# Patient Record
Sex: MALE | Race: BLACK OR AFRICAN AMERICAN | NOT HISPANIC OR LATINO | Employment: FULL TIME | URBAN - METROPOLITAN AREA
[De-identification: names, ages, dates, MRNs, and addresses within clinical notes are randomized per-mention and may not be internally consistent; named-entity substitution may affect disease eponyms.]

---

## 2020-03-08 ENCOUNTER — CLINICAL SUPPORT (OUTPATIENT)
Dept: CARDIOLOGY | Facility: HOSPITAL | Age: 20
End: 2020-03-08
Attending: EMERGENCY MEDICINE

## 2020-03-08 ENCOUNTER — HOSPITAL ENCOUNTER (OUTPATIENT)
Facility: HOSPITAL | Age: 20
Discharge: HOME | End: 2020-03-10
Attending: EMERGENCY MEDICINE | Admitting: HOSPITALIST

## 2020-03-08 DIAGNOSIS — I51.4 MYOCARDITIS, UNSPECIFIED CHRONICITY, UNSPECIFIED MYOCARDITIS TYPE: Primary | ICD-10-CM

## 2020-03-08 DIAGNOSIS — R07.9 CHEST PAIN: ICD-10-CM

## 2020-03-08 DIAGNOSIS — I31.9 PERICARDITIS: ICD-10-CM

## 2020-03-08 PROBLEM — I40.1 IDIOPATHIC MYOCARDITIS: Status: ACTIVE | Noted: 2020-03-08

## 2020-03-08 LAB
ALBUMIN SERPL BCP-MCNC: 4.3 G/DL (ref 3.5–5.2)
ALP SERPL-CCNC: 53 U/L (ref 55–135)
ALT SERPL W/O P-5'-P-CCNC: 23 U/L (ref 10–44)
AMPHET+METHAMPHET UR QL: NORMAL
ANION GAP SERPL CALC-SCNC: 14 MMOL/L (ref 8–16)
AORTIC ROOT ANNULUS: 3.02 CM
AORTIC VALVE CUSP SEPERATION: 1.49 CM
APTT PPP: 34.1 SEC (ref 23.6–33.3)
AST SERPL-CCNC: 102 U/L (ref 10–40)
AV PEAK GRADIENT: 3 MMHG
BACTERIA #/AREA URNS HPF: NEGATIVE /HPF
BARBITURATES UR QL SCN>200 NG/ML: NEGATIVE
BASOPHILS # BLD AUTO: 0.02 K/UL (ref 0–0.2)
BASOPHILS NFR BLD: 0.1 % (ref 0–1.9)
BENZODIAZ UR QL SCN>200 NG/ML: NEGATIVE
BILIRUB SERPL-MCNC: 1.2 MG/DL (ref 0.1–1)
BILIRUB UR QL STRIP: NEGATIVE
BSA FOR ECHO PROCEDURE: 1.81 M2
BUN SERPL-MCNC: 8 MG/DL (ref 6–20)
BZE UR QL SCN: NEGATIVE
CALCIUM SERPL-MCNC: 9.7 MG/DL (ref 8.7–10.5)
CANNABINOIDS UR QL SCN: NORMAL
CHLORIDE SERPL-SCNC: 93 MMOL/L (ref 95–110)
CK MB SERPL-MCNC: 139.5 NG/ML (ref 0.1–6.5)
CK SERPL-CCNC: 1244 U/L (ref 20–200)
CLARITY UR: CLEAR
CO2 SERPL-SCNC: 25 MMOL/L (ref 23–29)
COLOR UR: YELLOW
CREAT SERPL-MCNC: 0.7 MG/DL (ref 0.5–1.4)
CREAT UR-MCNC: 29 MG/DL (ref 23–375)
CRP SERPL-MCNC: 10.3 MG/DL (ref 0–0.75)
CV ECHO LV RWT: 0.33 CM
DIFFERENTIAL METHOD: ABNORMAL
DOP CALC AO PEAK VEL: 0.82 M/S
DOP CALC LVOT AREA: 3.5 CM2
DOP CALC LVOT DIAMETER: 2.1 CM
ECHO LV POSTERIOR WALL: 0.91 CM (ref 0.6–1.1)
EOSINOPHIL # BLD AUTO: 0 K/UL (ref 0–0.5)
EOSINOPHIL NFR BLD: 0 % (ref 0–8)
ERYTHROCYTE [DISTWIDTH] IN BLOOD BY AUTOMATED COUNT: 12.4 % (ref 11.5–14.5)
ERYTHROCYTE [SEDIMENTATION RATE] IN BLOOD BY WESTERGREN METHOD: 5 MM/HR (ref 0–10)
EST. GFR  (AFRICAN AMERICAN): >60 ML/MIN/1.73 M^2
EST. GFR  (NON AFRICAN AMERICAN): >60 ML/MIN/1.73 M^2
FRACTIONAL SHORTENING: 24 % (ref 28–44)
GLUCOSE SERPL-MCNC: 128 MG/DL (ref 70–110)
GLUCOSE UR QL STRIP: NEGATIVE
HCT VFR BLD AUTO: 45.9 % (ref 40–54)
HGB BLD-MCNC: 15.5 G/DL (ref 14–18)
HGB UR QL STRIP: ABNORMAL
HIV1+2 IGG SERPL QL IA.RAPID: NEGATIVE
HYALINE CASTS #/AREA URNS LPF: 0 /LPF
IMM GRANULOCYTES # BLD AUTO: 0.11 K/UL (ref 0–0.04)
IMM GRANULOCYTES NFR BLD AUTO: 0.5 % (ref 0–0.5)
INR PPP: 1.2
INTERVENTRICULAR SEPTUM: 0.73 CM (ref 0.6–1.1)
KETONES UR QL STRIP: NEGATIVE
LEFT ATRIUM SIZE: 3.52 CM
LEFT INTERNAL DIMENSION IN SYSTOLE: 4.17 CM (ref 2.1–4)
LEFT VENTRICLE MASS INDEX: 91 G/M2
LEFT VENTRICULAR INTERNAL DIMENSION IN DIASTOLE: 5.48 CM (ref 3.5–6)
LEFT VENTRICULAR MASS: 163.99 G
LEUKOCYTE ESTERASE UR QL STRIP: NEGATIVE
LYMPHOCYTES # BLD AUTO: 2.2 K/UL (ref 1–4.8)
LYMPHOCYTES NFR BLD: 11 % (ref 18–48)
MAGNESIUM SERPL-MCNC: 2.1 MG/DL (ref 1.6–2.6)
MCH RBC QN AUTO: 30.6 PG (ref 27–31)
MCHC RBC AUTO-ENTMCNC: 33.8 G/DL (ref 32–36)
MCV RBC AUTO: 91 FL (ref 82–98)
MICROSCOPIC COMMENT: NORMAL
MONOCYTES # BLD AUTO: 2.1 K/UL (ref 0.3–1)
MONOCYTES NFR BLD: 10.5 % (ref 4–15)
NEUTROPHILS # BLD AUTO: 15.9 K/UL (ref 1.8–7.7)
NEUTROPHILS NFR BLD: 77.9 % (ref 38–73)
NITRITE UR QL STRIP: NEGATIVE
NRBC BLD-RTO: 0 /100 WBC
OPIATES UR QL SCN: NEGATIVE
PCP UR QL SCN>25 NG/ML: NEGATIVE
PH UR STRIP: 7 [PH] (ref 5–8)
PISA TR MAX VEL: 2.59 M/S
PLATELET # BLD AUTO: 279 K/UL (ref 150–350)
PMV BLD AUTO: 10.3 FL (ref 9.2–12.9)
POTASSIUM SERPL-SCNC: 3.9 MMOL/L (ref 3.5–5.1)
PROT SERPL-MCNC: 7.8 G/DL (ref 6–8.4)
PROT UR QL STRIP: NEGATIVE
PROTHROMBIN TIME: 14.5 SEC (ref 10.6–14.8)
PV PEAK VELOCITY: 72.11 CM/S
RBC # BLD AUTO: 5.06 M/UL (ref 4.6–6.2)
RBC #/AREA URNS HPF: 2 /HPF (ref 0–4)
RIGHT VENTRICULAR END-DIASTOLIC DIMENSION: 196 CM
SODIUM SERPL-SCNC: 132 MMOL/L (ref 136–145)
SP GR UR STRIP: 1 (ref 1–1.03)
SQUAMOUS #/AREA URNS HPF: 0 /HPF
TDI LATERAL: 0.12 M/S
TDI SEPTAL: 0.11 M/S
TDI: 0.12 M/S
TOXICOLOGY INFORMATION: NORMAL
TR MAX PG: 27 MMHG
TROPONIN I SERPL DL<=0.01 NG/ML-MCNC: 15.91 NG/ML
TROPONIN I SERPL DL<=0.01 NG/ML-MCNC: 18.27 NG/ML
TROPONIN I SERPL DL<=0.01 NG/ML-MCNC: 25.42 NG/ML
URN SPEC COLLECT METH UR: ABNORMAL
UROBILINOGEN UR STRIP-ACNC: NEGATIVE EU/DL
WBC # BLD AUTO: 21.19 K/UL (ref 3.9–12.7)
WBC #/AREA URNS HPF: 1 /HPF (ref 0–5)

## 2020-03-08 PROCEDURE — 80307 DRUG TEST PRSMV CHEM ANLYZR: CPT

## 2020-03-08 PROCEDURE — 83735 ASSAY OF MAGNESIUM: CPT

## 2020-03-08 PROCEDURE — 82553 CREATINE MB FRACTION: CPT

## 2020-03-08 PROCEDURE — 87040 BLOOD CULTURE FOR BACTERIA: CPT | Mod: 59

## 2020-03-08 PROCEDURE — 96375 TX/PRO/DX INJ NEW DRUG ADDON: CPT

## 2020-03-08 PROCEDURE — 85730 THROMBOPLASTIN TIME PARTIAL: CPT

## 2020-03-08 PROCEDURE — 93306 TTE W/DOPPLER COMPLETE: CPT

## 2020-03-08 PROCEDURE — 84484 ASSAY OF TROPONIN QUANT: CPT | Mod: 91

## 2020-03-08 PROCEDURE — 93005 ELECTROCARDIOGRAM TRACING: CPT | Performed by: INTERNAL MEDICINE

## 2020-03-08 PROCEDURE — 96376 TX/PRO/DX INJ SAME DRUG ADON: CPT

## 2020-03-08 PROCEDURE — G0378 HOSPITAL OBSERVATION PER HR: HCPCS

## 2020-03-08 PROCEDURE — 81001 URINALYSIS AUTO W/SCOPE: CPT

## 2020-03-08 PROCEDURE — 25000003 PHARM REV CODE 250: Performed by: EMERGENCY MEDICINE

## 2020-03-08 PROCEDURE — 99291 CRITICAL CARE FIRST HOUR: CPT

## 2020-03-08 PROCEDURE — 85025 COMPLETE CBC W/AUTO DIFF WBC: CPT

## 2020-03-08 PROCEDURE — 96374 THER/PROPH/DIAG INJ IV PUSH: CPT

## 2020-03-08 PROCEDURE — 80053 COMPREHEN METABOLIC PANEL: CPT

## 2020-03-08 PROCEDURE — 85610 PROTHROMBIN TIME: CPT

## 2020-03-08 PROCEDURE — 86140 C-REACTIVE PROTEIN: CPT

## 2020-03-08 PROCEDURE — 25000003 PHARM REV CODE 250: Performed by: HOSPITALIST

## 2020-03-08 PROCEDURE — 82550 ASSAY OF CK (CPK): CPT

## 2020-03-08 PROCEDURE — 93005 ELECTROCARDIOGRAM TRACING: CPT

## 2020-03-08 PROCEDURE — 85651 RBC SED RATE NONAUTOMATED: CPT

## 2020-03-08 PROCEDURE — 63600175 PHARM REV CODE 636 W HCPCS: Performed by: HOSPITALIST

## 2020-03-08 PROCEDURE — 63600175 PHARM REV CODE 636 W HCPCS: Performed by: EMERGENCY MEDICINE

## 2020-03-08 PROCEDURE — 36415 COLL VENOUS BLD VENIPUNCTURE: CPT

## 2020-03-08 PROCEDURE — 86703 HIV-1/HIV-2 1 RESULT ANTBDY: CPT

## 2020-03-08 RX ORDER — INDOMETHACIN 75 MG/1
75 CAPSULE, EXTENDED RELEASE ORAL 2 TIMES DAILY WITH MEALS
Status: DISCONTINUED | OUTPATIENT
Start: 2020-03-08 | End: 2020-03-08

## 2020-03-08 RX ORDER — ONDANSETRON 2 MG/ML
4 INJECTION INTRAMUSCULAR; INTRAVENOUS
Status: COMPLETED | OUTPATIENT
Start: 2020-03-08 | End: 2020-03-08

## 2020-03-08 RX ORDER — KETOROLAC TROMETHAMINE 30 MG/ML
15 INJECTION, SOLUTION INTRAMUSCULAR; INTRAVENOUS EVERY 6 HOURS PRN
Status: DISCONTINUED | OUTPATIENT
Start: 2020-03-08 | End: 2020-03-09

## 2020-03-08 RX ORDER — ASPIRIN 81 MG/1
81 TABLET ORAL DAILY
Status: DISCONTINUED | OUTPATIENT
Start: 2020-03-08 | End: 2020-03-10 | Stop reason: HOSPADM

## 2020-03-08 RX ORDER — ONDANSETRON 2 MG/ML
4 INJECTION INTRAMUSCULAR; INTRAVENOUS EVERY 6 HOURS PRN
Status: DISCONTINUED | OUTPATIENT
Start: 2020-03-08 | End: 2020-03-10 | Stop reason: HOSPADM

## 2020-03-08 RX ORDER — NAPROXEN SODIUM 220 MG/1
324 TABLET, FILM COATED ORAL
Status: COMPLETED | OUTPATIENT
Start: 2020-03-08 | End: 2020-03-08

## 2020-03-08 RX ORDER — COLCHICINE 0.6 MG/1
0.6 TABLET, FILM COATED ORAL
Status: COMPLETED | OUTPATIENT
Start: 2020-03-08 | End: 2020-03-08

## 2020-03-08 RX ORDER — COLCHICINE 0.6 MG/1
0.6 TABLET, FILM COATED ORAL EVERY 6 HOURS
Status: DISCONTINUED | OUTPATIENT
Start: 2020-03-08 | End: 2020-03-09

## 2020-03-08 RX ORDER — ACETAMINOPHEN 325 MG/1
650 TABLET ORAL EVERY 6 HOURS PRN
Status: DISCONTINUED | OUTPATIENT
Start: 2020-03-08 | End: 2020-03-10 | Stop reason: HOSPADM

## 2020-03-08 RX ORDER — SODIUM CHLORIDE 0.9 % (FLUSH) 0.9 %
10 SYRINGE (ML) INJECTION
Status: DISCONTINUED | OUTPATIENT
Start: 2020-03-08 | End: 2020-03-10 | Stop reason: HOSPADM

## 2020-03-08 RX ORDER — KETOROLAC TROMETHAMINE 30 MG/ML
15 INJECTION, SOLUTION INTRAMUSCULAR; INTRAVENOUS
Status: COMPLETED | OUTPATIENT
Start: 2020-03-08 | End: 2020-03-08

## 2020-03-08 RX ADMIN — ONDANSETRON 4 MG: 2 INJECTION INTRAMUSCULAR; INTRAVENOUS at 09:03

## 2020-03-08 RX ADMIN — COLCHICINE 0.6 MG: 0.6 TABLET, FILM COATED ORAL at 11:03

## 2020-03-08 RX ADMIN — NITROGLYCERIN 1 INCH: 20 OINTMENT TOPICAL at 11:03

## 2020-03-08 RX ADMIN — ASPIRIN 81 MG 324 MG: 81 TABLET ORAL at 11:03

## 2020-03-08 RX ADMIN — KETOROLAC TROMETHAMINE 15 MG: 30 INJECTION, SOLUTION INTRAMUSCULAR at 07:03

## 2020-03-08 RX ADMIN — METHYLPREDNISOLONE SODIUM SUCCINATE 125 MG: 40 INJECTION, POWDER, FOR SOLUTION INTRAMUSCULAR; INTRAVENOUS at 07:03

## 2020-03-08 RX ADMIN — KETOROLAC TROMETHAMINE 15 MG: 30 INJECTION, SOLUTION INTRAMUSCULAR at 09:03

## 2020-03-08 RX ADMIN — ONDANSETRON 4 MG: 2 INJECTION INTRAMUSCULAR; INTRAVENOUS at 07:03

## 2020-03-08 RX ADMIN — COLCHICINE 0.6 MG: 0.6 TABLET, FILM COATED ORAL at 07:03

## 2020-03-08 NOTE — SUBJECTIVE & OBJECTIVE
Past Medical History:   Diagnosis Date    Substance abuse        Past Surgical History:   Procedure Laterality Date    TONSILLECTOMY         Review of patient's allergies indicates:   Allergen Reactions    Penicillins        No current facility-administered medications on file prior to encounter.      No current outpatient medications on file prior to encounter.     Family History     None        Tobacco Use    Smoking status: Current Every Day Smoker   Substance and Sexual Activity    Alcohol use: Yes    Drug use: Yes     Types: Marijuana     Comment: ECTASY    Sexual activity: Not on file     Review of Systems   Constitutional: Negative for activity change, appetite change, diaphoresis and fatigue.   HENT: Negative for sinus pressure, sore throat and trouble swallowing.    Respiratory: Negative for cough, choking and shortness of breath.    Cardiovascular: Positive for chest pain. Negative for palpitations and leg swelling.   Gastrointestinal: Negative for abdominal pain, constipation, diarrhea, nausea and vomiting.   Genitourinary: Negative for difficulty urinating, dysuria and frequency.   Musculoskeletal: Negative for back pain, gait problem and neck pain.   Skin: Negative for rash.   Neurological: Negative for seizures, syncope and weakness.   Psychiatric/Behavioral: Negative for confusion. The patient is not nervous/anxious.      Objective:     Vital Signs (Most Recent):  Temp: 98.2 °F (36.8 °C) (03/08/20 0914)  Pulse: 102 (03/08/20 1101)  Resp: 20 (03/08/20 1101)  BP: 127/65 (03/08/20 1101)  SpO2: 100 % (03/08/20 1101) Vital Signs (24h Range):  Temp:  [98.2 °F (36.8 °C)] 98.2 °F (36.8 °C)  Pulse:  [] 102  Resp:  [18-22] 20  SpO2:  [99 %-100 %] 100 %  BP: (117-127)/(65-96) 127/65     Weight: 70.3 kg (155 lb)  Body mass index is 25.02 kg/m².    Physical Exam   Constitutional: He is oriented to person, place, and time. He appears well-developed and well-nourished.   Patient appears in no distress  lying on a gurney in the emergency department   Eyes: Conjunctivae are normal. No scleral icterus.   Sclera non icteric   Neck: Normal range of motion. Neck supple.   Cardiovascular: Normal rate, regular rhythm and normal heart sounds.   Pulmonary/Chest: Effort normal and breath sounds normal.   Abdominal: Soft. Bowel sounds are normal. There is no tenderness.   Musculoskeletal: Normal range of motion.   Neurological: He is alert and oriented to person, place, and time.   Patient is alert oriented x3 motor exam is nonfocal   Skin: Skin is warm. No rash noted.   Psychiatric: He has a normal mood and affect. His behavior is normal.   Nursing note and vitals reviewed.             Significant Labs:   BMP:   Recent Labs   Lab 03/08/20  0939   *   *   K 3.9   CL 93*   CO2 25   BUN 8   CREATININE 0.7   CALCIUM 9.7   MG 2.1     CBC:   Recent Labs   Lab 03/08/20  0939   WBC 21.19*   HGB 15.5   HCT 45.9        CMP:   Recent Labs   Lab 03/08/20  0939   *   K 3.9   CL 93*   CO2 25   *   BUN 8   CREATININE 0.7   CALCIUM 9.7   PROT 7.8   ALBUMIN 4.3   BILITOT 1.2*   ALKPHOS 53*   *   ALT 23   ANIONGAP 14   EGFRNONAA >60.0       Significant Imaging:  Chest x-ray read by me shows no acute cardiopulmonary process  ECG read by me shows sinus rhythm elevated ST segments

## 2020-03-08 NOTE — HPI
Patient is a 19-year-old male with no known past medical history who presents to our ER while visiting [facility]. .  Patient states over the last 2-3 days he has been smoking pot, taking Ecstasy and drinking some alcohol and energy drinks .  Today patient has been having moderate substernal chest pain which he describes as constant and radiating toward his neck.  Patient denies fever chills nausea vomiting diaphoresis abdominal pain bowel changes or urinary changes.  Pain is relieved by leaning forward  Patient was seen by Cardiology in the emergency department and I discussed case with Dr. Jordan.  Cardiology feels that patient probably has myocarditi Patient has had No recent illness.  He lives in [place]- no travel other than here

## 2020-03-08 NOTE — ED PROVIDER NOTES
Encounter Date: 3/8/2020       History     Chief Complaint   Patient presents with    Chest Pain     X 1 DAY, NEG SCREEN    Nausea     Patient with no significant past medical history.  Patient is visiting [facility].  Patient states he did drink energy drink and took ecstasy yesterday.  Today yes sternal chest pain.  Described as a uncomfortable sternal sensation.  No radiation.  Improved with leaning forward.  No fever or chills. No recent illness.  Pain is moderate in nature and continues in the emergency room.        Review of patient's allergies indicates:   Allergen Reactions    Penicillins      History reviewed. No pertinent past medical history.  Past Surgical History:   Procedure Laterality Date    TONSILLECTOMY       No family history on file.  Social History     Tobacco Use    Smoking status: Current Every Day Smoker   Substance Use Topics    Alcohol use: Yes    Drug use: Yes     Types: Marijuana     Comment: ECTASY     Review of Systems   Constitutional: Negative for chills and fever.   HENT: Negative for sore throat.    Eyes: Negative for photophobia and visual disturbance.   Respiratory: Negative for shortness of breath.    Cardiovascular: Positive for chest pain.   Gastrointestinal: Negative for abdominal pain and vomiting.   Genitourinary: Negative for dysuria.   Musculoskeletal: Negative for joint swelling.   Skin: Negative for rash.   Neurological: Negative for weakness and headaches.   Psychiatric/Behavioral: Negative for confusion.       Physical Exam     Initial Vitals [03/08/20 0914]   BP Pulse Resp Temp SpO2   121/83 96 18 98.2 °F (36.8 °C) 100 %      MAP       --         Physical Exam    Nursing note and vitals reviewed.  Constitutional: He is not diaphoretic. No distress.   HENT:   Head: Normocephalic and atraumatic.   Eyes: Conjunctivae are normal.   Neck: Normal range of motion.   Cardiovascular: Regular rhythm.   No rubs or murmurs   Pulmonary/Chest: Breath sounds normal.   No real  reproducible chest wall pain   Abdominal: Soft. There is no tenderness.   Musculoskeletal: Normal range of motion.   Skin: No rash noted.   Psychiatric: He has a normal mood and affect.         ED Course   Critical Care  Date/Time: 3/8/2020 10:54 AM  Performed by: Mario Mccain MD  Authorized by: Mario Mccain MD   Direct patient critical care time: 15 minutes  Additional history critical care time: 5 minutes  Ordering / reviewing critical care time: 5 minutes  Documentation critical care time: 5 minutes  Consulting other physicians critical care time: 5 minutes  Total critical care time (exclusive of procedural time) : 35 minutes        Labs Reviewed   PROTIME-INR   APTT   DRUG SCREEN PANEL, URINE EMERGENCY   CBC W/ AUTO DIFFERENTIAL   COMPREHENSIVE METABOLIC PANEL   TROPONIN I   CK-MB   CK   MAGNESIUM   URINALYSIS, REFLEX TO URINE CULTURE          Imaging Results          X-Ray Chest PA And Lateral (In process)                  Medical Decision Making:   History:   Old Medical Records: I decided to obtain old medical records.  Clinical Tests:   Lab Tests: Reviewed  Radiological Study: Reviewed  Medical Tests: Reviewed  ED Management:  Patient presents with history and physical exam more consistent with pericarditis.  Patient's EKG does have p.r. depression with diffuse ST segment elevation.  Patient troponin, CK, MB are I will elevated suggestive of myocarditis.  Stat echocardiogram ordered.  Discussed with Dr. lawrence.  Patient feeling better after Toradol.  Hospitalist consult for admission.  Dr. lawrence does desire other medications such as nitrates, aspirin, colchicine.  Patient does have elevated inflammatory markers.  White blood cell count is elevated with no other signs of bacterial illness.  Discussed with Cardiology initiated shin of antibiotics.  Given no other symptoms of bacterial illness will not give antibiotics.  Blood cultures pending.                                 Clinical  Impression:       ICD-10-CM ICD-9-CM   1. Myocarditis, unspecified chronicity, unspecified myocarditis type I51.4 429.0   2. Chest pain R07.9 786.50   3. Pericarditis I31.9 423.9                                Mario Mccain MD  03/08/20 1056

## 2020-03-08 NOTE — H&P
[facility]  History & Physical    Patient Name: Gail Vieyra  MRN: [id number]  Admission Date: 3/8/2020  Attending Physician: Jc Torres DO   Primary Care Provider: Primary Doctor No         Patient information was obtained from patient and ER records.  And ED physician    Subjective:     Principal Problem:Idiopathic myocarditis    Chief Complaint:   Chief Complaint   Patient presents with    Chest Pain     X 1 DAY, NEG SCREEN    Nausea        HPI: Patient is a 19-year-old male with no known past medical history who presents to our ER while visiting [facility]. .  Patient states over the last 2-3 days he has been smoking pot, taking Ecstasy and drinking some alcohol and energy drinks .  Today patient has been having moderate substernal chest pain which he describes as constant and radiating toward his neck.  Patient denies fever chills nausea vomiting diaphoresis abdominal pain bowel changes or urinary changes.  Pain is relieved by leaning forward  Patient was seen by Cardiology in the emergency department and I discussed case with Dr. Jordan.   Cardiology feels that patient probably has myocarditi Patient has had No recent illness.  He lives in [place]- no travel other than here       Past Medical History:   Diagnosis Date    Substance abuse        Past Surgical History:   Procedure Laterality Date    TONSILLECTOMY         Review of patient's allergies indicates:   Allergen Reactions    Penicillins        No current facility-administered medications on file prior to encounter.      No current outpatient medications on file prior to encounter.     Family History     None        Tobacco Use    Smoking status: Current Every Day Smoker   Substance and Sexual Activity    Alcohol use: Yes    Drug use: Yes     Types: Marijuana     Comment: ECTASY    Sexual activity: Not on file     Review of Systems   Constitutional: Negative for activity change, appetite change, diaphoresis and  fatigue.   HENT: Negative for sinus pressure, sore throat and trouble swallowing.    Respiratory: Negative for cough, choking and shortness of breath.    Cardiovascular: Positive for chest pain. Negative for palpitations and leg swelling.   Gastrointestinal: Negative for abdominal pain, constipation, diarrhea, nausea and vomiting.   Genitourinary: Negative for difficulty urinating, dysuria and frequency.   Musculoskeletal: Negative for back pain, gait problem and neck pain.   Skin: Negative for rash.   Neurological: Negative for seizures, syncope and weakness.   Psychiatric/Behavioral: Negative for confusion. The patient is not nervous/anxious.      Objective:     Vital Signs (Most Recent):  Temp: 98.2 °F (36.8 °C) (03/08/20 0914)  Pulse: 102 (03/08/20 1101)  Resp: 20 (03/08/20 1101)  BP: 127/65 (03/08/20 1101)  SpO2: 100 % (03/08/20 1101) Vital Signs (24h Range):  Temp:  [98.2 °F (36.8 °C)] 98.2 °F (36.8 °C)  Pulse:  [] 102  Resp:  [18-22] 20  SpO2:  [99 %-100 %] 100 %  BP: (117-127)/(65-96) 127/65     Weight: 70.3 kg (155 lb)  Body mass index is 25.02 kg/m².    Physical Exam   Constitutional: He is oriented to person, place, and time. He appears well-developed and well-nourished.   Patient appears in no distress lying on a gurney in the emergency department   Eyes: Conjunctivae are normal. No scleral icterus.   Sclera non icteric   Neck: Normal range of motion. Neck supple.   Cardiovascular: Normal rate, regular rhythm and normal heart sounds.   Pulmonary/Chest: Effort normal and breath sounds normal.   Abdominal: Soft. Bowel sounds are normal. There is no tenderness.   Musculoskeletal: Normal range of motion.   Neurological: He is alert and oriented to person, place, and time.   Patient is alert oriented x3 motor exam is nonfocal   Skin: Skin is warm. No rash noted.   Psychiatric: He has a normal mood and affect. His behavior is normal.   Nursing note and vitals reviewed.             Significant Labs:    BMP:   Recent Labs   Lab 03/08/20  0939   *   *   K 3.9   CL 93*   CO2 25   BUN 8   CREATININE 0.7   CALCIUM 9.7   MG 2.1     CBC:   Recent Labs   Lab 03/08/20  0939   WBC 21.19*   HGB 15.5   HCT 45.9        CMP:   Recent Labs   Lab 03/08/20  0939   *   K 3.9   CL 93*   CO2 25   *   BUN 8   CREATININE 0.7   CALCIUM 9.7   PROT 7.8   ALBUMIN 4.3   BILITOT 1.2*   ALKPHOS 53*   *   ALT 23   ANIONGAP 14   EGFRNONAA >60.0       Significant Imaging:  Chest x-ray read by me shows no acute cardiopulmonary process  ECG read by me shows sinus rhythm elevated ST segments    Assessment/Plan:     #1 Acute myocarditis  #2 Elevated liver enzyme  #3 The hyponatremia  #4 History of illicit drug use    Case discussed with cardiologist Dr. Jordan  Will place patient in observation.  Echocardiogram is pending.  Serial cardiac enzymes.  Will start colchicine and Indocin.    VTE Risk Mitigation (From admission, onward)         Ordered     IP VTE LOW RISK PATIENT  Once      03/08/20 1125     Place MARTELL hose  Until discontinued      03/08/20 1125     Place sequential compression device  Until discontinued      03/08/20 1125                   Jc Torres DO  Department of Hospital Medicine   [facility]
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
## 2020-03-08 NOTE — ED NOTES
"States he woke up with chest pain last night which continues today. Anterior chest without radiation. Only relief is when he was sitting up and leaning forward. He admits to using Ecstasy and drinking a "Bang" drink yesterday. Denies SOB, Pos+ nausea    Adult Physical Assessment  LOC: Gail Vieyra, 19 y.o. male verified via two identifiers.  The patient is awake, alert, oriented and speaking appropriately at this time.  APPEARANCE: Patient resting, appears to be in no acute distress but does appear to be uncomfortable at this time. Patient is clean and well groomed, patient's clothing is properly fastened.  SKIN:The skin is warm and dry, color consistent with ethnicity, patient has normal skin turgor and moist mucus membranes, skin intact, no breakdown or brusing noted.  MUSCULOSKELETAL: Patient moving all extremities well, no obvious swelling or deformities noted.  RESPIRATORY: Airway is open and patent, respirations are spontaneous, patient has a normal effort and rate, no accessory muscle use noted.  CARDIAC: Patient has a normal rate and rhythm, no periphreal edema noted in any extremity, capillary refill < 3 seconds in all extremities. CM SR without ectopy. ST segment elevation noted on CM. EKG completed and shown to Dr Mccain.  ABDOMEN: Soft and non tender to palpation, no abdominal distention noted. Bowel sounds present in all four quadrants.  NEUROLOGIC: Eyes open spontaneously, behavior appropriate to situation, follows commands, facial expression symmetrical, bilateral hand grasp equal and even, purposeful motor response noted, normal sensation in all extremities when touched with a finger.  "

## 2020-03-09 LAB
ANION GAP SERPL CALC-SCNC: 10 MMOL/L (ref 8–16)
BUN SERPL-MCNC: 17 MG/DL (ref 6–20)
CALCIUM SERPL-MCNC: 9.5 MG/DL (ref 8.7–10.5)
CHLORIDE SERPL-SCNC: 101 MMOL/L (ref 95–110)
CO2 SERPL-SCNC: 26 MMOL/L (ref 23–29)
CREAT SERPL-MCNC: 0.9 MG/DL (ref 0.5–1.4)
ERYTHROCYTE [DISTWIDTH] IN BLOOD BY AUTOMATED COUNT: 12.2 % (ref 11.5–14.5)
EST. GFR  (AFRICAN AMERICAN): >60 ML/MIN/1.73 M^2
EST. GFR  (NON AFRICAN AMERICAN): >60 ML/MIN/1.73 M^2
GLUCOSE SERPL-MCNC: 150 MG/DL (ref 70–110)
HCT VFR BLD AUTO: 47.8 % (ref 40–54)
HGB BLD-MCNC: 15.6 G/DL (ref 14–18)
MAGNESIUM SERPL-MCNC: 2.3 MG/DL (ref 1.6–2.6)
MCH RBC QN AUTO: 30.1 PG (ref 27–31)
MCHC RBC AUTO-ENTMCNC: 32.6 G/DL (ref 32–36)
MCV RBC AUTO: 92 FL (ref 82–98)
PLATELET # BLD AUTO: 267 K/UL (ref 150–350)
PMV BLD AUTO: 10.8 FL (ref 9.2–12.9)
POTASSIUM SERPL-SCNC: 4.6 MMOL/L (ref 3.5–5.1)
RBC # BLD AUTO: 5.19 M/UL (ref 4.6–6.2)
SODIUM SERPL-SCNC: 137 MMOL/L (ref 136–145)
TROPONIN I SERPL DL<=0.01 NG/ML-MCNC: 3.29 NG/ML
WBC # BLD AUTO: 6.4 K/UL (ref 3.9–12.7)

## 2020-03-09 PROCEDURE — 80048 BASIC METABOLIC PNL TOTAL CA: CPT

## 2020-03-09 PROCEDURE — 25000003 PHARM REV CODE 250: Performed by: HOSPITALIST

## 2020-03-09 PROCEDURE — G0378 HOSPITAL OBSERVATION PER HR: HCPCS

## 2020-03-09 PROCEDURE — 83735 ASSAY OF MAGNESIUM: CPT

## 2020-03-09 PROCEDURE — 84484 ASSAY OF TROPONIN QUANT: CPT

## 2020-03-09 PROCEDURE — 36415 COLL VENOUS BLD VENIPUNCTURE: CPT

## 2020-03-09 PROCEDURE — 85027 COMPLETE CBC AUTOMATED: CPT

## 2020-03-09 RX ORDER — COLCHICINE 0.6 MG/1
0.6 TABLET, FILM COATED ORAL 2 TIMES DAILY
Status: DISCONTINUED | OUTPATIENT
Start: 2020-03-09 | End: 2020-03-10 | Stop reason: HOSPADM

## 2020-03-09 RX ORDER — INDOMETHACIN 50 MG/1
50 SUPPOSITORY RECTAL EVERY 8 HOURS
Status: DISCONTINUED | OUTPATIENT
Start: 2020-03-09 | End: 2020-03-10

## 2020-03-09 RX ADMIN — ASPIRIN 81 MG: 81 TABLET, DELAYED RELEASE ORAL at 09:03

## 2020-03-09 RX ADMIN — COLCHICINE 0.6 MG: 0.6 TABLET, FILM COATED ORAL at 01:03

## 2020-03-09 RX ADMIN — COLCHICINE 0.6 MG: 0.6 TABLET, FILM COATED ORAL at 12:03

## 2020-03-09 RX ADMIN — COLCHICINE 0.6 MG: 0.6 TABLET, FILM COATED ORAL at 11:03

## 2020-03-09 NOTE — SUBJECTIVE & OBJECTIVE
Interval History:  Patient states he feels improved chest pain has almost resolved  No shortness of breath no abdominal pain no nausea no vomiting.  No fever no chills.      Review of Systems  Objective:     Vital Signs (Most Recent):  Temp: 98.1 °F (36.7 °C) (03/09/20 0728)  Pulse: 76 (03/09/20 0728)  Resp: 16 (03/09/20 0728)  BP: 101/63 (03/09/20 0728)  SpO2: 98 % (03/09/20 0728) Vital Signs (24h Range):  Temp:  [98 °F (36.7 °C)-98.6 °F (37 °C)] 98.1 °F (36.7 °C)  Pulse:  [] 76  Resp:  [15-22] 16  SpO2:  [96 %-100 %] 98 %  BP: ()/(46-84) 101/63     Weight: 70.1 kg (154 lb 9.4 oz)  Body mass index is 24.95 kg/m².    Intake/Output Summary (Last 24 hours) at 3/9/2020 1028  Last data filed at 3/9/2020 0902  Gross per 24 hour   Intake 370 ml   Output --   Net 370 ml      Physical Exam patient appears in no distress sitting in bed   HEENT sclerae nonicteric  Neck is supple nontender  Lungs are clear  Heart is regular  Abdomen is soft nontender  Extremities no edema  Neuro patient is alert oriented x3  Ext no edema   exam no Pepper  Skin multiple tattoos no rash  Psych patient is calm cooperative    Significant Labs:   BMP:   Recent Labs   Lab 03/09/20 0338   *      K 4.6      CO2 26   BUN 17   CREATININE 0.9   CALCIUM 9.5   MG 2.3     CBC:   Recent Labs   Lab 03/08/20 0939 03/09/20 0338   WBC 21.19* 6.40   HGB 15.5 15.6   HCT 45.9 47.8    267     CMP:   Recent Labs   Lab 03/08/20 0939 03/09/20 0338   * 137   K 3.9 4.6   CL 93* 101   CO2 25 26   * 150*   BUN 8 17   CREATININE 0.7 0.9   CALCIUM 9.7 9.5   PROT 7.8  --    ALBUMIN 4.3  --    BILITOT 1.2*  --    ALKPHOS 53*  --    *  --    ALT 23  --    ANIONGAP 14 10   EGFRNONAA >60.0 >60.0       Significant Imaging:  Telemetry reviewed by me shows sinus rhythm no acute

## 2020-03-09 NOTE — PROGRESS NOTES
[facility]  Cardiology  Progress Note    Patient Name: Gail Vieyra  MRN: [id number]  Admission Date: 3/8/2020  Hospital Length of Stay: 0 days  Code Status: Full Code   Attending Physician: Jc Torres DO   Primary Care Physician: Primary Doctor No  Expected Discharge Date:   Principal Problem:Idiopathic myocarditis    Subjective:       Interval History: Mr. Vieyra reports improvement in chest discomfort and is in no acute distress today.       ROS     Review of Systems   Constitution: Negative for diaphoresis and fever.   HENT: Negative for nosebleeds.    Cardiovascular: Negative for  dyspnea on exertion, leg swelling and palpitations. Chest discomfort  Respiratory: Negative for shortness of breath and wheezing.    Hematologic/Lymphatic: Negative for bleeding problem. Does not bruise/bleed easily.   Skin: Negative for color change and rash.   Musculoskeletal: Negative for falls and myalgias.   Gastrointestinal: Negative for hematemesis and hematochezia.   Genitourinary: Negative for hematuria.   Neurological: Negative for dizziness and light-headedness.   Psychiatric/Behavioral: Negative for altered mental status and memory loss.   Objective:     Vital Signs (Most Recent):  Temp: 98.1 °F (36.7 °C) (03/09/20 1130)  Pulse: 106 (03/09/20 1130)  Resp: 20 (03/09/20 1130)  BP: 108/72 (03/09/20 1130)  SpO2: 98 % (03/09/20 1130) Vital Signs (24h Range):  Temp:  [98 °F (36.7 °C)-98.2 °F (36.8 °C)] 98.1 °F (36.7 °C)  Pulse:  [] 106  Resp:  [15-22] 20  SpO2:  [96 %-99 %] 98 %  BP: ()/(46-73) 108/72     Weight: 70.1 kg (154 lb 9.4 oz)  Body mass index is 24.95 kg/m².    SpO2: 98 %  O2 Device (Oxygen Therapy): room air      Intake/Output Summary (Last 24 hours) at 3/9/2020 1349  Last data filed at 3/9/2020 0902  Gross per 24 hour   Intake 370 ml   Output --   Net 370 ml       Lines/Drains/Airways     Peripheral Intravenous Line                 Peripheral IV - Single Lumen 03/08/20 0930 20 G  Left Hand 1 day                Physical Exam     HEENT: Normocephalic, atraumatic,   PERRL, Conjunctiva pink, no scleral icterus. Dilated Pupils  NECK:  No JVD no carotid bruit  CVS: S1S2+, RRR, no murmurs, rubs or gallops, JVP: Normal.  LUNGS: Clear  ABDOMEN: Soft, NT, BS+  EXTREMITIES: No cyanosis, clubbing or edema  Gu: No winters catheter, denies dysuria, no hematuria  NEURO: AAO X 3.   PSY :  Normal affect    Significant Labs:   Recent Lab Results       03/09/20  1121   03/09/20  0338   03/08/20  2004   03/08/20  1509   03/08/20  1355        Benzodiazepines         Negative     Phencyclidine         Negative     Amphetamine Screen, Ur         Presumptive Positive     Anion Gap   10           Appearance, UA         Clear     Bacteria, UA         Negative     Barbiturate Screen, Ur         Negative     Bilirubin (UA)         Negative     BUN, Bld   17           Calcium   9.5           Chloride   101           CO2   26           Cocaine (Metab.)         Negative     Color, UA         Yellow     Creatinine   0.9           Creatinine, Random Ur         29.0  Comment:  The random urine reference ranges provided were established   for 24 hour urine collections.  No reference ranges exist for  random urine specimens.  Correlate clinically.       eGFR if    >60.0           eGFR if non    >60.0  Comment:  Calculation used to obtain the estimated glomerular filtration  rate (eGFR) is the CKD-EPI equation.              Glucose   150           Glucose, UA         Negative     Hematocrit   47.8           Hemoglobin   15.6           Hyaline Casts, UA         0     Ketones, UA         Negative     Leukocytes, UA         Negative     Magnesium   2.3           MCH   30.1           MCHC   32.6           MCV   92           Microscopic Comment         SEE COMMENT  Comment:  Other formed elements not mentioned in the report are not   present in the microscopic examination.        MPV   10.8            NITRITE UA         Negative     Occult Blood UA         1+     Opiate Scrn, Ur         Negative     pH, UA         7.0     Platelets   267           Potassium   4.6           Protein, UA         Negative  Comment:  Recommend a 24 hour urine protein or a urine   protein/creatinine ratio if globulin induced proteinuria is  clinically suspected.       RBC   5.19           RBC, UA         2     RDW   12.2           Sodium   137           Specific [place], UA         1.005     Specimen UA         Urine, Clean Catch     Squam Epithel, UA         0     Marijuana (THC) Metabolite         Presumptive Positive     Toxicology Information         SEE COMMENT  Comment:  This screen includes the following classes of drugs at the   listed cut-off:  Benzodiazepines                  200 ng/ml  Cocaine metabolite               300 ng/ml  Opiates                          300 ng/ml  Barbiturates                     200 ng/ml  Amphetamines                    1000 ng/ml  Marijuana metabs (THC)            50 ng/ml  Phencyclidine (PCP)               25 ng/ml  High concentrations of Methylenedioxymethamphetamine (MDMA aka  Ectasy) and other structurally similar compounds may cross-   react with the Amphetamine/Methamphetamine screening   immunoassay giving a false positive result.  Note: This exception list includes only more common   interferants in toxicology screen testing.  Because of many   cross-reactantspositive results on toxicology drug screens   should be confirmed whenever results do not correlate with   clinical presentation.  This report is intended for use in clinical monitoring and  management of patients. It is not intended for use in   employment related drug testing.  Because of any cross-reactants, positive results on toxicology  drug screens should be confirmed whenever results do not  correlate with clinical presentation.  Presumptive positive results are unconfirmed and may be used   only for medical purposes.        Troponin I 3.295  Comment:  Troponin critical result(s) repeated. Called and verbal readback   obtained from Samanta Reilly RN/3000 by JB8 03/09/2020 12:22     15.907  Comment:  Troponin ritical result(s) called and verbal readback obtained   from Dulce Roberts RN 3000W. by TC1 03/08/2020 21:03   25.417  Comment:  Trop critical result(s) repeated. Called and verbal readback obtained   from Raissa Oswald RN/3000 by WCS 03/08/2020 15:48         UROBILINOGEN UA         Negative     WBC, UA         1     WBC   6.40                                Significant Imaging:     Chest xray:  Impression:       Normal chest.       Assessment and Plan:    1.  Acute pericarditis and acute myocarditis.  2.  Elevated troponin  secondary due to acute myocarditis.  3.  Substance abuse  4.  Abnormal EKG with ST T-wave changes consistent with pericarditis  5.  Elevated AST possible ethanol use    PLAN:    1. Patient appears to be doing well today and is in no acute distress. Troponin level has trended down to 3.295.   2. Continue colchicine and indomethacin.   3. Had a discussion with the patient on the need to abstain from drug use and the potential consequences of further drug use over time.   4. Anticipate dc home soon.        Active Diagnoses:    Diagnosis Date Noted POA    PRINCIPAL PROBLEM:  Idiopathic myocarditis [I40.1] 03/08/2020 Unknown    Myocarditis [I51.4] 03/08/2020 Yes      Problems Resolved During this Admission:       VTE Risk Mitigation (From admission, onward)         Ordered     IP VTE LOW RISK PATIENT  Once      03/08/20 1125     Place MARTELL hose  Until discontinued      03/08/20 1125     Place sequential compression device  Until discontinued      03/08/20 1125                Claribel Goldberg NP  Cardiology  [facility]

## 2020-03-09 NOTE — PROGRESS NOTES
[facility] Medicine  Progress Note    Patient Name: Gail Vieyra  MRN: [id number]  Patient Class: OP- Observation   Admission Date: 3/8/2020  Length of Stay: 0 days  Attending Physician: Jc Torres DO  Primary Care Provider: Primary Doctor No        Subjective:     Principal Problem:Idiopathic myocarditis        HPI:  Patient is a 19-year-old male with no known past medical history who presents to our ER while visiting [facility]. .  Patient states over the last 2-3 days he has been smoking pot, taking Ecstasy and drinking some alcohol and energy drinks .  Today patient has been having moderate substernal chest pain which he describes as constant and radiating toward his neck.  Patient denies fever chills nausea vomiting diaphoresis abdominal pain bowel changes or urinary changes.  Pain is relieved by leaning forward  Patient was seen by Cardiology in the emergency department and I discussed case with Dr. Jordan.   Cardiology feels that patient probably has myocarditi Patient has had No recent illness.  He lives in [place]- no travel other than here       Overview/Hospital Course:  No notes on file    Interval History:  Patient states he feels improved chest pain has almost resolved  No shortness of breath no abdominal pain no nausea no vomiting.  No fever no chills.      Review of Systems  Objective:     Vital Signs (Most Recent):  Temp: 98.1 °F (36.7 °C) (03/09/20 0728)  Pulse: 76 (03/09/20 0728)  Resp: 16 (03/09/20 0728)  BP: 101/63 (03/09/20 0728)  SpO2: 98 % (03/09/20 0728) Vital Signs (24h Range):  Temp:  [98 °F (36.7 °C)-98.6 °F (37 °C)] 98.1 °F (36.7 °C)  Pulse:  [] 76  Resp:  [15-22] 16  SpO2:  [96 %-100 %] 98 %  BP: ()/(46-84) 101/63     Weight: 70.1 kg (154 lb 9.4 oz)  Body mass index is 24.95 kg/m².    Intake/Output Summary (Last 24 hours) at 3/9/2020 1028  Last data filed at 3/9/2020 0902  Gross per 24 hour   Intake 370 ml   Output --   Net 370 ml      Physical Exam  patient appears in no distress sitting in bed   HEENT sclerae nonicteric  Neck is supple nontender  Lungs are clear  Heart is regular  Abdomen is soft nontender  Extremities no edema  Neuro patient is alert oriented x3  Ext no edema   exam no Pepper  Skin multiple tattoos no rash  Psych patient is calm cooperative    Significant Labs:   BMP:   Recent Labs   Lab 03/09/20  0338   *      K 4.6      CO2 26   BUN 17   CREATININE 0.9   CALCIUM 9.5   MG 2.3     CBC:   Recent Labs   Lab 03/08/20 0939 03/09/20  0338   WBC 21.19* 6.40   HGB 15.5 15.6   HCT 45.9 47.8    267     CMP:   Recent Labs   Lab 03/08/20 0939 03/09/20  0338   * 137   K 3.9 4.6   CL 93* 101   CO2 25 26   * 150*   BUN 8 17   CREATININE 0.7 0.9   CALCIUM 9.7 9.5   PROT 7.8  --    ALBUMIN 4.3  --    BILITOT 1.2*  --    ALKPHOS 53*  --    *  --    ALT 23  --    ANIONGAP 14 10   EGFRNONAA >60.0 >60.0       Significant Imaging:  Telemetry reviewed by me shows sinus rhythm no acute      Assessment/Plan:      #1 Acute pericarditis/ myocarditis-etiology not determined  #2 Elevated troponin  secondary due to acute myocarditis.  #3  Substance abuse  #4 Illicit drug use  #5 Elevated AST- ? possible ethanol use       Will continue colchicine.  DC Toradol and start Indocin.  Discussed need to discontinue illicit drug use.  Echocardiogram shows EF of 50%  Hyperglycemia may be due to 1 dose of Solu-Medrol received yesterday  Possible discharge tomorrow    VTE Risk Mitigation (From admission, onward)         Ordered     IP VTE LOW RISK PATIENT  Once      03/08/20 1125     Place MARTELL hose  Until discontinued      03/08/20 1125     Place sequential compression device  Until discontinued      03/08/20 1125                      Jc Torres DO  Department of Hospital Medicine   [facility]

## 2020-03-10 VITALS
DIASTOLIC BLOOD PRESSURE: 64 MMHG | TEMPERATURE: 98 F | BODY MASS INDEX: 24.84 KG/M2 | HEART RATE: 67 BPM | OXYGEN SATURATION: 98 % | SYSTOLIC BLOOD PRESSURE: 100 MMHG | RESPIRATION RATE: 16 BRPM | WEIGHT: 154.56 LBS | HEIGHT: 66 IN

## 2020-03-10 LAB
TROPONIN I SERPL DL<=0.01 NG/ML-MCNC: 5.52 NG/ML
TROPONIN I SERPL DL<=0.01 NG/ML-MCNC: 7.01 NG/ML

## 2020-03-10 PROCEDURE — 84484 ASSAY OF TROPONIN QUANT: CPT | Mod: 91

## 2020-03-10 PROCEDURE — 36415 COLL VENOUS BLD VENIPUNCTURE: CPT

## 2020-03-10 PROCEDURE — 84484 ASSAY OF TROPONIN QUANT: CPT

## 2020-03-10 PROCEDURE — G0378 HOSPITAL OBSERVATION PER HR: HCPCS

## 2020-03-10 PROCEDURE — 25000003 PHARM REV CODE 250: Performed by: HOSPITALIST

## 2020-03-10 RX ORDER — COLCHICINE 0.6 MG/1
0.6 TABLET ORAL 2 TIMES DAILY
Qty: 60 TABLET | Refills: 11
Start: 2020-03-10 | End: 2021-03-10

## 2020-03-10 RX ORDER — INDOMETHACIN 25 MG/1
75 CAPSULE ORAL 2 TIMES DAILY WITH MEALS
Status: DISCONTINUED | OUTPATIENT
Start: 2020-03-10 | End: 2020-03-10 | Stop reason: HOSPADM

## 2020-03-10 RX ORDER — INDOMETHACIN 75 MG/1
75 CAPSULE, EXTENDED RELEASE ORAL 2 TIMES DAILY WITH MEALS
Status: DISCONTINUED | OUTPATIENT
Start: 2020-03-10 | End: 2020-03-10

## 2020-03-10 RX ORDER — INDOMETHACIN 25 MG/1
75 CAPSULE ORAL 2 TIMES DAILY WITH MEALS
Start: 2020-03-10

## 2020-03-10 RX ADMIN — COLCHICINE 0.6 MG: 0.6 TABLET, FILM COATED ORAL at 10:03

## 2020-03-10 RX ADMIN — ASPIRIN 81 MG: 81 TABLET, DELAYED RELEASE ORAL at 10:03

## 2020-03-10 RX ADMIN — INDOMETHACIN 50 MG: 50 SUPPOSITORY RECTAL at 10:03

## 2020-03-10 NOTE — PROGRESS NOTES
[facility]  Cardiology  Progress Note    Patient Name: Gail Vieyra  MRN: [id number]  Admission Date: 3/8/2020  Hospital Length of Stay: 0 days  Code Status: Full Code   Attending Physician: Mary Jo Martini MD   Primary Care Physician: Primary Doctor No  Expected Discharge Date:   Principal Problem:Idiopathic myocarditis    Subjective:       Interval History:  Patient is resting comfortably in room with no acute distress noted.  He denies any further chest discomfort.      ROS     Review of Systems   Constitution: Negative for diaphoresis and fever.   HENT: Negative for nosebleeds.    Cardiovascular: Negative for  dyspnea on exertion, leg swelling and palpitations. Chest discomfort  Respiratory: Negative for shortness of breath and wheezing.    Hematologic/Lymphatic: Negative for bleeding problem. Does not bruise/bleed easily.   Skin: Negative for color change and rash.   Musculoskeletal: Negative for falls and myalgias.   Gastrointestinal: Negative for hematemesis and hematochezia.   Genitourinary: Negative for hematuria.   Neurological: Negative for dizziness and light-headedness.   Psychiatric/Behavioral: Negative for altered mental status and memory loss.   Objective:     Vital Signs (Most Recent):  Temp: 98.1 °F (36.7 °C) (03/10/20 0733)  Pulse: 67 (03/10/20 0733)  Resp: 16 (03/10/20 0733)  BP: 100/64 (03/10/20 0733)  SpO2: 98 % (03/10/20 0733) Vital Signs (24h Range):  Temp:  [98 °F (36.7 °C)-98.3 °F (36.8 °C)] 98.1 °F (36.7 °C)  Pulse:  [63-93] 67  Resp:  [16-18] 16  SpO2:  [94 %-100 %] 98 %  BP: ()/(58-69) 100/64     Weight: 70.1 kg (154 lb 9.4 oz)  Body mass index is 24.95 kg/m².    SpO2: 98 %  O2 Device (Oxygen Therapy): room air      Intake/Output Summary (Last 24 hours) at 3/10/2020 1216  Last data filed at 3/10/2020 0852  Gross per 24 hour   Intake 480 ml   Output --   Net 480 ml       Lines/Drains/Airways     Peripheral Intravenous Line                 Peripheral IV - Single  Lumen 03/08/20 0930 20 G Left Hand 2 days                Physical Exam     HEENT: Normocephalic, atraumatic,   PERRL, Conjunctiva pink, no scleral icterus. Dilated Pupils  NECK:  No JVD no carotid bruit  CVS: S1S2+, RRR, no murmurs, rubs or gallops, JVP: Normal.  LUNGS: Clear  ABDOMEN: Soft, NT, BS+  EXTREMITIES: No cyanosis, clubbing or edema  Gu: No winters catheter, denies dysuria, no hematuria  NEURO: AAO X 3.   PSY :  Normal affect    Significant Labs:   Recent Lab Results       03/10/20  0421        Troponin I 7.011  Comment:  Troponin critical result(s) called and verbal readback obtained   from Carrie Horne RN 3000 by MS1 03/10/2020 05:43             Significant Imaging:     Chest xray:  Impression:       Normal chest.       Assessment and Plan:    1.  Acute pericarditis and acute myocarditis.  2.  Elevated troponin  secondary due to acute myocarditis.  3.  Substance abuse  4.  Abnormal EKG with ST T-wave changes consistent with pericarditis  5.  Elevated AST possible ethanol use    PLAN:    1.  Patient denies any further chest discomfort.  From cardiac standpoint he is stable for discharge.  2.  Continue colchicine 0.6 mg p.o. b.i.d. and indomethacin 75 mg p.o. b.i.d..    3. Recommend follow-up with cardiology 2 weeks post discharge.  Had a discussion with the patient regarding the need to abstain from stimulant drugs.       Active Diagnoses:    Diagnosis Date Noted POA    PRINCIPAL PROBLEM:  Idiopathic myocarditis [I40.1] 03/08/2020 Unknown    Myocarditis [I51.4] 03/08/2020 Yes      Problems Resolved During this Admission:       VTE Risk Mitigation (From admission, onward)         Ordered     IP VTE LOW RISK PATIENT  Once      03/08/20 1125     Place MARTELL hose  Until discontinued      03/08/20 1125     Place sequential compression device  Until discontinued      03/08/20 1125                Claribel Goldberg NP  Cardiology  [facility]

## 2020-03-11 NOTE — DISCHARGE SUMMARY
[facility] Medicine  Discharge Summary      Patient Name: Gail Vieyra  MRN: [id number]  Admission Date: 3/8/2020  Hospital Length of Stay: 0 days  Discharge Date and Time: 3/10/2020  1:32 PM  Attending Physician: No att. providers found   Discharging Provider: Mary Jo Martini MD  Primary Care Provider: Primary Doctor No      HPI:   Patient is a 19-year-old male with no known past medical history who presents to our ER while visiting [facility]. .  Patient states over the last 2-3 days he has been smoking pot, taking Ecstasy and drinking some alcohol and energy drinks .  Today patient has been having moderate substernal chest pain which he describes as constant and radiating toward his neck.  Patient denies fever chills nausea vomiting diaphoresis abdominal pain bowel changes or urinary changes.  Pain is relieved by leaning forward  Patient was seen by Cardiology in the emergency department and I discussed case with Dr. Jordan.   Cardiology feels that patient probably has myocarditi Patient has had No recent illness.  He lives in [place]- no travel other than here       * No surgery found *      Hospital Course:   Patient admitted with myocarditis.  Elevated troponin on presentation with Echo and EKG consistent with pericarditis. Patient with large degree of stimulants on board- discussed/counseled against. Started on Indomethacin and Colchicine. No chest pain for the past 48 hours.  Troponin increased from 3 to 7 today.  No pain.  Cardiology recommended continued monitoring in hospital but patient declined, removed his IV per RN, and requesting to leave as he has a 6 hour drive to [place].  Counseled on risks of leaving as well as going to the nearest ED for any chest pain. Examined on day of discharge and RRR, CTA B.  Rx for medication provided.  REcommended follow up with cardiology and PCP when he returns home. Counseled on smoking cessation.      Consults:     No new Assessment & Plan  notes have been filed under this hospital service since the last note was generated.  Service: Hospital Medicine    Final Active Diagnoses:    Diagnosis Date Noted POA    PRINCIPAL PROBLEM:  Idiopathic myocarditis [I40.1] 03/08/2020 Yes    Myocarditis [I51.4] 03/08/2020 Yes      Problems Resolved During this Admission:       Discharged Condition: good    Disposition: Home or Self Care    Follow Up:  Follow-up Information     Primary Doctor No In 2 weeks.    Why:  recheck of symptoms           Cardiology In 2 weeks.    Why:  recheck of symptoms                Patient Instructions:      Diet Adult Regular     Notify your health care provider if you experience any of the following:  temperature >100.4     Notify your health care provider if you experience any of the following:  persistent nausea and vomiting or diarrhea     Notify your health care provider if you experience any of the following:  severe uncontrolled pain     Activity as tolerated       Significant Diagnostic Studies: Labs:   CMP   Recent Labs   Lab 03/09/20  0338      K 4.6      CO2 26   *   BUN 17   CREATININE 0.9   CALCIUM 9.5   ANIONGAP 10   ESTGFRAFRICA >60.0   EGFRNONAA >60.0    and CBC   Recent Labs   Lab 03/09/20  0338   WBC 6.40   HGB 15.6   HCT 47.8          Pending Diagnostic Studies:     None         Medications:  Reconciled Home Medications:      Medication List      START taking these medications    colchicine 0.6 mg tablet  Commonly known as:  COLCRYS  Take 1 tablet (0.6 mg total) by mouth 2 (two) times daily.     indomethacin 25 MG capsule  Commonly known as:  INDOCIN  Take 3 capsules (75 mg total) by mouth 2 (two) times daily with meals.            Indwelling Lines/Drains at time of discharge:   Lines/Drains/Airways     None                 Time spent on the discharge of patient: 29 minutes  Patient was seen and examined on the date of discharge and determined to be suitable for discharge.         Mary Jo REDD  MD Vini  Department of Hospital Medicine  [facility]

## 2020-03-11 NOTE — HOSPITAL COURSE
Patient admitted with myocarditis.  Elevated troponin on presentation with Echo and EKG consistent with pericarditis. Patient with large degree of stimulants on board- discussed/counseled against. Started on Indomethacin and Colchicine. No chest pain for the past 48 hours.  Troponin increased from 3 to 7 today.  No pain.  Cardiology recommended continued monitoring in hospital but patient declined, removed his IV per RN, and requesting to leave as he has a 6 hour drive to [place].  Counseled on risks of leaving as well as going to the nearest ED for any chest pain. Examined on day of discharge and RRR, CTA B.  Rx for medication provided.  REcommended follow up with cardiology and PCP when he returns home. Counseled on smoking cessation.

## 2020-03-13 LAB
BACTERIA BLD CULT: NORMAL
BACTERIA BLD CULT: NORMAL